# Patient Record
Sex: FEMALE | URBAN - METROPOLITAN AREA
[De-identification: names, ages, dates, MRNs, and addresses within clinical notes are randomized per-mention and may not be internally consistent; named-entity substitution may affect disease eponyms.]

---

## 2018-01-16 ENCOUNTER — DOCUMENTATION ONLY (OUTPATIENT)
Dept: SURGERY | Facility: CLINIC | Age: 58
End: 2018-01-16

## 2018-06-18 ENCOUNTER — THERAPY VISIT (OUTPATIENT)
Dept: PHYSICAL THERAPY | Facility: CLINIC | Age: 58
End: 2018-06-18
Payer: COMMERCIAL

## 2018-06-18 DIAGNOSIS — M62.830 BACK MUSCLE SPASM: Primary | ICD-10-CM

## 2018-06-18 PROCEDURE — 97161 PT EVAL LOW COMPLEX 20 MIN: CPT | Mod: GP | Performed by: PHYSICAL THERAPIST

## 2018-06-18 PROCEDURE — 97112 NEUROMUSCULAR REEDUCATION: CPT | Mod: GP | Performed by: PHYSICAL THERAPIST

## 2018-06-18 PROCEDURE — 97110 THERAPEUTIC EXERCISES: CPT | Mod: GP | Performed by: PHYSICAL THERAPIST

## 2018-06-18 NOTE — LETTER
[facility]  [address]  [facility] [address]  [phone]    2018    Re: Eusebia Lynn   :   1960  MRN:  [id number]   REFERRING PHYSICIAN:   Reshma River    [facility] PHYSICAL [facility]    Date of Initial Evaluation:  2018  Visits:  Rxs Used: 1  Reason for Referral:  Back muscle spasm    EVALUATION SUMMARY    [facility] Initial Evaluation  Subjective:  Patient is a 58 year old female presenting with rehab back hpi. The history is provided by the patient. No  was used.   Eusebia Lynn is a 58 year old female with a thoracic condition. Condition occurred with: A fall/slip. Condition occurred: at home. This is a new condition. In April on or about 2018 I fell in my drive way. My feet slipped out on me and I landed on my back. I was able to get up and go to work. Slowly over the next few weeks I had some upper back pain. I had a pinch nerve on the left side. The nerve pain got better. I saw the MD on 2018. The MD feels like the muscles are tight. I can't sleep very well. I have been using heat and IB.    Patient reports pain: Thoracic spine left, mid thoracic spine and upper thoracic spine (bilateral shoulder upper arms numbness and tinglings). Radiates to: Gluteals right, lower leg right, thigh right and knee right (numbness with sitting and at night). Pain is described as sharp and is intermittent (arm numbness/leg numbness, upper back pain) and reported as 9/10. Associated symptoms: Numbness and tingling (deep breath/ cough and sneeze). Pain is the same all the time. Symptoms are exacerbated by bending, lifting, lying down, sitting and twisting and relieved by heat, muscle relaxants and NSAID's. Since onset symptoms are gradually worsening (more pain more often).  Special testing: none. Previous treatment: none. General  health as reported by patient is good. Pertinent medical history includes: Depression, diabetes, high blood pressure, overweight and thyroid problems. Medical allergies: no. Surgical history: gastric bypass 2004. Current medications: Anti-depressants, high blood pressure medication, thyroid medication and muscle relaxants. Current occupation is Play ground supervisor. Patient is working in normal job without restrictions (off for the summer). Primary job tasks include: Lifting, prolonged standing and repetitive tasks (push-pull).    Barriers: apartment has pool.    Red flags: N/T in the legs.          Re: Eusebia Lynn   :   1960    Objective:  Standing Alignment:    Cervical/Thoracic:  Forward head and thoracic kyphosis increased (fair sitting posture)  Shoulder/UE:  Rounded shoulders, elevated scapula R, scapular abduction L and scapular abduction R (internal rotation of bilateral shoulders)  Lumbar:  Lordosis incr  Flexibility/Screens:   Positive screens:  Cervical and ThoracicNegative screens: Shoulder   Upper Extremity:    Decreased left upper extremity flexibility at:  Pectoralis Major and Pectoralis Minor  Decreased right upper extremity flexibility present at:  Pectoralis Major; Pectoralis Minor and Latissimus  Spine:  Decreased left spine flexibility:  Scalenes  Decreased right spine flexibility:  Scalenes      Cervical/Thoracic Evaluation  AROM:  AROM Cervical:  Flexion:          WFL  Extension:       Extension 75% of motion  Rotation:         Left: 75% of motion     Right: moderate motion  Side Bend:      Left:     Right:   AROM Thoracic:  Flexion:               Extension:          Minimal   Rotation:            Left: minimal and painful     Right: minimal and painful     Strength: shoulder flexion WFl, abduction WFL, able to reach to opposite shoulder, behind the head and behind the back wtih tightness  Headaches: none  Cervical Myotomes:    C1-2 (Neck Flex): Left:  5    Right: 5  C3 (neck side  bend): Left: 5    Right: 5  C4 (shrug):  Left: 5    Right: 5  C5 (Deltoid):  Left: 5    Right: 5  C6 (Biceps):  Left: 5    Right: 5  C7 (Triceps):  Left: 4+    Right: 5  C8 (Thumb Ext): Left: 5    Right: 5  T1 (Intrinsics): Left: 5    Right: 5    Cervical Palpation:  : thoracic from T8-T12.  Tenderness present at Left:    Erector Spinae and Facet  Tenderness present at Right:    Erector Spinae and Facet    Assessment/Plan:    Patient is a 58 year old female with thoracic complaints.    Patient has the following significant findings with corresponding treatment plan.                Diagnosis 1:  Back spasms, thoracic  Pain -  manual therapy, self management, education, directional preference exercise and home program  Decreased ROM/flexibility - manual therapy, therapeutic exercise, therapeutic activity and home program  Re: Eusebia Lynn   :   1960    Decreased strength - therapeutic exercise, therapeutic activities and home program  Impaired muscle performance - neuro re-education and home program  Decreased function - therapeutic activities and home program  Impaired posture - neuro re-education, therapeutic activities and home program    Therapy Evaluation Codes:   1) History comprised of:   Personal factors that impact the plan of care:      Profession.    Comorbidity factors that impact the plan of care are:      Diabetes, Depression, Overweight and gastric bypass.     Medications impacting care: Anti-depressant and Muscle relaxant.  2) Examination of Body Systems comprised of:   Body structures and functions that impact the plan of care:      Cervical spine and Thoracic Spine.   Activity limitations that impact the plan of care are:      Bending, Cooking, Driving, Dressing, Lifting, Sitting, Working and Sleeping.  3) Clinical presentation characteristics are:   Stable/Uncomplicated.  4) Decision-Making    Low complexity using standardized patient assessment instrument and/or   measureable assessment of  functional outcome.  Cumulative Therapy Evaluation is: Low complexity.    Previous and current functional limitations:  (See Goal Flow Sheet for this information)    Short term and Long term goals: (See Goal Flow Sheet for this information)     Communication ability:  Patient appears to be able to clearly communicate and understand verbal and written communication and follow directions correctly.  Treatment Explanation - The following has been discussed with the patient:   RX ordered/plan of care  Possible risks and side effects  This patient would benefit from PT intervention to resume normal activities.   Rehab potential is good.    Frequency:  2 X week, once daily  Duration:  for 1 weeks tapering to 1 X a week over 4 weeks  Discharge Plan:  Achieve all LTG.  Independent in home treatment program.      Thank you for your referral.    INQUIRIES  Therapist: Honey Bundy, PT  [facility]  [address] [address]  Phone: [phone]  Fax: [phone]

## 2018-06-18 NOTE — PROGRESS NOTES
[facility] Initial Evaluation  Subjective:  Patient is a 58 year old female presenting with rehab back hpi. The history is provided by the patient. No  was used.   Eusebia Lynn is a 58 year old female with a thoracic condition.  Condition occurred with:  A fall/slip.  Condition occurred: at home.  This is a new condition  In April on or about 4/7/2018 I fell in my drive way.  My feet slipped out on me and I landed on my back.  I was able to get up and go to work.  Slowly over the next few weeks I had some upper back pain.  I had a pinch nerve on the left side. The nerve pain got better.  I saw the MD on 6/13/2018.  The MD feels like the muscles are tight.  I can't sleep very well.  I have been using heat and IB..    Patient reports pain:  Thoracic spine left, mid thoracic spine and upper thoracic spine (bilateral shoulder upper arms numbness and tinglings).  Radiates to:  Gluteals right, lower leg right, thigh right and knee right (numbness with sitting and at night. ).  Pain is described as sharp and is intermittent (arm numbness/leg numbness, upper back pain) and reported as 9/10.  Associated symptoms:  Numbness and tingling (deep breath/ cough and sneeze). Pain is the same all the time.  Symptoms are exacerbated by bending, lifting, lying down, sitting and twisting and relieved by heat, muscle relaxants and NSAID's.  Since onset symptoms are gradually worsening (more pain more often).  Special testing: none.  Previous treatment: none.    General health as reported by patient is good.  Pertinent medical history includes:  Depression, diabetes, high blood pressure, overweight and thyroid problems.  Medical allergies: no.  Surgical history: gastric bypass 2004.  Current medications:  Anti-depressants, high blood pressure medication, thyroid medication and muscle relaxants.  Current occupation is Play ground supervisor.  Patient is working in normal job without restrictions  (off for the summer).  Primary job tasks include:  Lifting, prolonged standing and repetitive tasks (push-pull).    Barriers: apartment has pool.    Red flags: N/T in the legs.                        Objective:  Standing Alignment:    Cervical/Thoracic:  Forward head and thoracic kyphosis increased (fair sitting posture)  Shoulder/UE:  Rounded shoulders, elevated scapula R, scapular abduction L and scapular abduction R (internal rotation of bilateral shoulders)  Lumbar:  Lordosis incr                Flexibility/Screens:   Positive screens:  Cervical and ThoracicNegative screens: Shoulder   Upper Extremity:    Decreased left upper extremity flexibility at:  Pectoralis Major and Pectoralis Minor    Decreased right upper extremity flexibility present at:  Pectoralis Major; Pectoralis Minor and Latissimus    Spine:  Decreased left spine flexibility:  Scalenes    Decreased right spine flexibility:  Scalenes                  Cervical/Thoracic Evaluation    AROM:  AROM Cervical:    Flexion:          WFL  Extension:       Extension 75% of motion  Rotation:         Left: 75% of motion     Right: moderate motion  Side Bend:      Left:     Right:   AROM Thoracic:    Flexion:               Extension:          Minimal   Rotation:            Left: minimal and painful     Right: minimal and painful     Strength: shoulder flexion WFl, abduction WFL, able to reach to opposite shoulder, behind the head and behind the back wtih tightness  Headaches: none  Cervical Myotomes:    C1-2 (Neck Flex): Left:  5    Right: 5  C3 (neck side bend): Left: 5    Right: 5  C4 (shrug):  Left: 5    Right: 5  C5 (Deltoid):  Left: 5    Right: 5  C6 (Biceps):  Left: 5    Right: 5  C7 (Triceps):  Left: 4+    Right: 5  C8 (Thumb Ext): Left: 5    Right: 5  T1 (Intrinsics): Left: 5    Right: 5        Cervical Palpation:  : thoracic from T8-T12.  Tenderness present at Left:    Erector Spinae and Facet  Tenderness present at Right:    Erector Spinae and  Facet                                                  General     ROS    Assessment/Plan:    Patient is a 58 year old female with thoracic complaints.    Patient has the following significant findings with corresponding treatment plan.                Diagnosis 1:  Back spasms, thoracic  Pain -  manual therapy, self management, education, directional preference exercise and home program  Decreased ROM/flexibility - manual therapy, therapeutic exercise, therapeutic activity and home program  Decreased strength - therapeutic exercise, therapeutic activities and home program  Impaired muscle performance - neuro re-education and home program  Decreased function - therapeutic activities and home program  Impaired posture - neuro re-education, therapeutic activities and home program    Therapy Evaluation Codes:   1) History comprised of:   Personal factors that impact the plan of care:      Profession.    Comorbidity factors that impact the plan of care are:      Diabetes, Depression, Overweight and gastric bypass.     Medications impacting care: Anti-depressant and Muscle relaxant.  2) Examination of Body Systems comprised of:   Body structures and functions that impact the plan of care:      Cervical spine and Thoracic Spine.   Activity limitations that impact the plan of care are:      Bending, Cooking, Driving, Dressing, Lifting, Sitting, Working and Sleeping.  3) Clinical presentation characteristics are:   Stable/Uncomplicated.  4) Decision-Making    Low complexity using standardized patient assessment instrument and/or measureable assessment of functional outcome.  Cumulative Therapy Evaluation is: Low complexity.    Previous and current functional limitations:  (See Goal Flow Sheet for this information)    Short term and Long term goals: (See Goal Flow Sheet for this information)     Communication ability:  Patient appears to be able to clearly communicate and understand verbal and written communication and follow  directions correctly.  Treatment Explanation - The following has been discussed with the patient:   RX ordered/plan of care  Possible risks and side effects  This patient would benefit from PT intervention to resume normal activities.   Rehab potential is good.    Frequency:  2 X week, once daily  Duration:  for 1 weeks tapering to 1 X a week over 4 weeks  Discharge Plan:  Achieve all LTG.  Independent in home treatment program.    Please refer to the daily flowsheet for treatment today, total treatment time and time spent performing 1:1 timed codes.

## 2018-06-18 NOTE — MR AVS SNAPSHOT
"              After Visit Summary   6/18/2018    Eusebia Lynn    MRN: [id number]           Patient Information     Date Of Birth          1960        Visit Information        Provider Department      6/18/2018 4:50 PM Honey Bundy PT [facility] Physical Therapy        Today's Diagnoses     Back muscle spasm    -  1       Follow-ups after your visit        Your next 10 appointments already scheduled     Jun 21, 2018  7:40 AM CDT   [facility] with Honey Bundy PT   [facility] [employer] [facility] Physical Therapy ([facility] [place])    [address] [address] [phone]              Who to contact     If you have questions or need follow up information about today's clinic visit or your schedule please contact [facility] ATHLETIC [facility] PHYSICAL [facility] directly at [phone].  Normal or non-critical lab and imaging results will be communicated to you by [employer], letter or phone within 4 business days after the clinic has received the results. If you do not hear from us within 7 days, please contact the clinic through [employer] or phone. If you have a critical or abnormal lab result, we will notify you by phone as soon as possible.  Submit refill requests through [employer] or call your pharmacy and they will forward the refill request to us. Please allow 3 business days for your refill to be completed.          Additional Information About Your Visit        MyChart Information     [employer] lets you send messages to your doctor, view your test results, renew your prescriptions, schedule appointments and more. To sign up, go to [email or web address] . Click on \"Log in\" on the left side of the screen, which will take you to the Welcome page. Then click on \"Sign up Now\" on the right side of the page.     You will be asked to enter the access code listed below, as well as some personal " information. Please follow the directions to create your username and password.     Your access code is: [email or web address]  Expires: 2018  9:20 PM     Your access code will  in 90 days. If you need help or a new code, please call your [place] clinic or [phone].        Care EveryWhere ID     This is your Care EveryWhere ID. This could be used by other organizations to access your [place] medical records  [id number]         Blood Pressure from Last 3 Encounters:   No data found for BP    Weight from Last 3 Encounters:   No data found for Wt              We Performed the Following     [facility] Inital Eval Report     Neuromuscular Re-Education     PT Eval, Low Complexity (14751)     Therapeutic Exercises        Primary Care Provider    None Specified       No primary provider on file.        Equal Access to Services     [facility] [employer] : Chet Christianson, waaxda luqadaha, qaybta kaalmada aren, otto lisa . So [facility] [phone].    ATENCIÓN: Si habla español, tiene a alcocer disposición servicios gratuitos de asistencia lingüística. Llame al [phone].    We comply with applicable federal civil rights laws and [place] laws. We do not discriminate on the basis of race, color, national origin, age, disability, sex, sexual orientation, or gender identity.            Thank you!     Thank you for choosing [facility] PHYSICAL THERAPY  for your care. Our goal is always to provide you with excellent care. Hearing back from our patients is one way we can continue to improve our services. Please take a few minutes to complete the written survey that you may receive in the mail after your visit with us. Thank you!             Your Updated Medication List - Protect others around you: Learn how to safely use, store and throw away your medicines at www.disposemymeds.org.      Notice  As of 2018  9:20 PM    You have not been prescribed any  medications.

## 2018-06-21 ENCOUNTER — THERAPY VISIT (OUTPATIENT)
Dept: PHYSICAL THERAPY | Facility: CLINIC | Age: 58
End: 2018-06-21
Payer: COMMERCIAL

## 2018-06-21 DIAGNOSIS — M62.830 BACK MUSCLE SPASM: ICD-10-CM

## 2018-06-21 PROCEDURE — 97110 THERAPEUTIC EXERCISES: CPT | Mod: GP | Performed by: PHYSICAL THERAPIST

## 2018-06-21 PROCEDURE — 97140 MANUAL THERAPY 1/> REGIONS: CPT | Mod: GP | Performed by: PHYSICAL THERAPIST

## 2018-06-28 ENCOUNTER — THERAPY VISIT (OUTPATIENT)
Dept: PHYSICAL THERAPY | Facility: CLINIC | Age: 58
End: 2018-06-28
Payer: COMMERCIAL

## 2018-06-28 DIAGNOSIS — M62.830 BACK MUSCLE SPASM: ICD-10-CM

## 2018-06-28 PROCEDURE — 97112 NEUROMUSCULAR REEDUCATION: CPT | Mod: GP | Performed by: PHYSICAL THERAPIST

## 2018-06-28 PROCEDURE — 97140 MANUAL THERAPY 1/> REGIONS: CPT | Mod: GP | Performed by: PHYSICAL THERAPIST

## 2018-06-28 PROCEDURE — 97110 THERAPEUTIC EXERCISES: CPT | Mod: GP | Performed by: PHYSICAL THERAPIST

## 2018-07-12 ENCOUNTER — THERAPY VISIT (OUTPATIENT)
Dept: PHYSICAL THERAPY | Facility: CLINIC | Age: 58
End: 2018-07-12
Payer: COMMERCIAL

## 2018-07-12 DIAGNOSIS — M62.830 BACK MUSCLE SPASM: ICD-10-CM

## 2018-07-12 PROCEDURE — 97140 MANUAL THERAPY 1/> REGIONS: CPT | Mod: GP | Performed by: PHYSICAL THERAPIST

## 2018-07-12 PROCEDURE — 97110 THERAPEUTIC EXERCISES: CPT | Mod: GP | Performed by: PHYSICAL THERAPIST

## 2018-07-12 PROCEDURE — 97530 THERAPEUTIC ACTIVITIES: CPT | Mod: GP | Performed by: PHYSICAL THERAPIST

## 2018-07-19 ENCOUNTER — THERAPY VISIT (OUTPATIENT)
Dept: PHYSICAL THERAPY | Facility: CLINIC | Age: 58
End: 2018-07-19
Payer: COMMERCIAL

## 2018-07-19 DIAGNOSIS — M62.830 BACK MUSCLE SPASM: ICD-10-CM

## 2018-07-19 PROCEDURE — 97140 MANUAL THERAPY 1/> REGIONS: CPT | Mod: GP | Performed by: PHYSICAL THERAPIST

## 2018-07-19 PROCEDURE — 97110 THERAPEUTIC EXERCISES: CPT | Mod: GP | Performed by: PHYSICAL THERAPIST

## 2018-07-19 NOTE — MR AVS SNAPSHOT
"              After Visit Summary   2018    Eusebia Lynn    MRN: [id number]           Patient Information     Date Of Birth          1960        Visit Information        Provider Department      2018 9:00 AM Honey Bundy PT [facility] Physical [facility]        Today's Diagnoses     Back muscle spasm           Follow-ups after your visit        Who to contact     If you have questions or need follow up information about today's clinic visit or your schedule please contact [facility] ATHLETIC [facility] PHYSICAL [facility] directly at [phone].  Normal or non-critical lab and imaging results will be communicated to you by [employer]hart, letter or phone within 4 business days after the clinic has received the results. If you do not hear from us within 7 days, please contact the clinic through [employer] or phone. If you have a critical or abnormal lab result, we will notify you by phone as soon as possible.  Submit refill requests through [employer] or call your pharmacy and they will forward the refill request to us. Please allow 3 business days for your refill to be completed.          Additional Information About Your Visit        MyChart Information     [employer] lets you send messages to your doctor, view your test results, renew your prescriptions, schedule appointments and more. To sign up, go to [email or web address] . Click on \"Log in\" on the left side of the screen, which will take you to the Welcome page. Then click on \"Sign up Now\" on the right side of the page.     You will be asked to enter the access code listed below, as well as some personal information. Please follow the directions to create your username and password.     Your access code is: [email or web address]  Expires: 2018  9:20 PM     Your access code will  in 90 days. If you need help or a new code, please call your [place] clinic or [phone].        Care EveryWhere ID  "    This is your Care EveryWhere ID. This could be used by other organizations to access your [facility] medical records  [id number]         Blood Pressure from Last 3 Encounters:   No data found for BP    Weight from Last 3 Encounters:   No data found for Wt              We Performed the Following     Manual Ther Tech, 1+Regions, EA 15 min     Therapeutic Exercises        Primary Care Provider    None Specified       No primary provider on file.        Equal Access to Services     [facility]: Hadii aad ku hadasho Soomaali, waaxda luqadaha, qaybta kaalmada adeegyada, otto jaimesin hayaan adeblayne aguirrejorgerex lamaame . So [facility] [phone].    ATENCIÓN: Si habla español, tiene a alcocer disposición servicios gratuitos de asistencia lingüística. Benitoame al [phone].    We comply with applicable federal civil rights laws and [place] laws. We do not discriminate on the basis of race, color, national origin, age, disability, sex, sexual orientation, or gender identity.            Thank you!     Thank you for choosing [place] [facility] PHYSICAL THERAPY  for your care. Our goal is always to provide you with excellent care. Hearing back from our patients is one way we can continue to improve our services. Please take a few minutes to complete the written survey that you may receive in the mail after your visit with us. Thank you!             Your Updated Medication List - Protect others around you: Learn how to safely use, store and throw away your medicines at www.disposemymeds.org.      Notice  As of 7/19/2018 11:27 AM    You have not been prescribed any medications.

## 2018-09-07 PROBLEM — M62.830 BACK MUSCLE SPASM: Status: RESOLVED | Noted: 2018-06-18 | Resolved: 2018-09-07

## 2018-09-07 NOTE — PROGRESS NOTES
Subjective:  HPI                    Objective:  System    Physical Exam    General     ROS    Assessment/Plan:    DISCHARGE REPORT    Progress reporting period is from 6/188/2018 to 7/19/2017.       SUBJECTIVE  Subjective changes noted by patient:   I got stung by a bee and had increase in swelling in the left hand so have not been as good with exercises. My back has not gotten any worse but has bot gotten any better for today, overall better.     Current Pain level: 1/10.     Previous pain level was  9/10  .   Changes in function:  Yes (See Goal flowsheet attached for changes in current functional level)  Adverse reaction to treatment or activity: activity - hitting a side mirror, getting stung by a bee     OBJECTIVE  Changes noted in objective findings:  Patient has failed to return to therapy so current objective findings are unknown. and The objective findings below are from DOS 7/19/2018 CROM flexion, extension WFL , TROM  left rotation minimal, right moderate, flexion WFL,  post therapy, left rotation moderate, right 75%, discussed proper sitting posture.  Discussed with patient progression of exercises and home exercise program.       ASSESSMENT/PLAN  Updated problem list and treatment plan: Diagnosis 1:  Thoracic spasms  Pain -  self management and home program  Decreased ROM/flexibility - home program  Decreased joint mobility - home program  STG/LTGs have been met or progress has been made towards goals:  Yes (See Goal flow sheet completed today.)  Assessment of Progress: The patient's condition is improving.  The patient has not returned to therapy. Current status is unknown.  Self Management Plans:  Patient has been instructed in a home treatment program.  Patient  has been instructed in self management of symptoms.    Recommendations:  Patient did not return to therapy.  Plan to discharge patient at this time.    Please refer to the daily flowsheet for treatment today, total treatment time and time  spent performing 1:1 timed codes.